# Patient Record
Sex: FEMALE | Race: WHITE | Employment: OTHER | URBAN - METROPOLITAN AREA
[De-identification: names, ages, dates, MRNs, and addresses within clinical notes are randomized per-mention and may not be internally consistent; named-entity substitution may affect disease eponyms.]

---

## 2017-03-31 NOTE — TELEPHONE ENCOUNTER
Patient is requesting refill for Methotrexate 2.5 mg, to take 4 tablets once weekly. LOV 11/14/16. RTC 05/15/17. Last refill on 10/14/16 for 52 tablets / 1 refill. Please advise.

## 2017-04-13 ENCOUNTER — HOSPITAL ENCOUNTER (INPATIENT)
Facility: HOSPITAL | Age: 82
LOS: 7 days | Discharge: HOME HEALTH | DRG: 176 | End: 2017-04-20
Attending: EMERGENCY MEDICINE | Admitting: HOSPITALIST
Payer: MEDICARE

## 2017-04-13 ENCOUNTER — APPOINTMENT (OUTPATIENT)
Dept: ULTRASOUND IMAGING | Facility: HOSPITAL | Age: 82
DRG: 176 | End: 2017-04-13
Attending: EMERGENCY MEDICINE
Payer: MEDICARE

## 2017-04-13 ENCOUNTER — APPOINTMENT (OUTPATIENT)
Dept: GENERAL RADIOLOGY | Facility: HOSPITAL | Age: 82
DRG: 176 | End: 2017-04-13
Attending: EMERGENCY MEDICINE
Payer: MEDICARE

## 2017-04-13 DIAGNOSIS — I82.492 ACUTE DEEP VEIN THROMBOSIS (DVT) OF OTHER SPECIFIED VEIN OF LEFT LOWER EXTREMITY (HCC): ICD-10-CM

## 2017-04-13 DIAGNOSIS — I26.99 OTHER ACUTE PULMONARY EMBOLISM WITHOUT ACUTE COR PULMONALE (HCC): Primary | ICD-10-CM

## 2017-04-13 PROBLEM — R07.9 CHEST PAIN: Status: ACTIVE | Noted: 2017-04-13

## 2017-04-13 PROCEDURE — 99223 1ST HOSP IP/OBS HIGH 75: CPT | Performed by: HOSPITALIST

## 2017-04-13 PROCEDURE — 71010 XR CHEST AP PORTABLE  (CPT=71010): CPT

## 2017-04-13 PROCEDURE — 93970 EXTREMITY STUDY: CPT

## 2017-04-13 RX ORDER — ACETAMINOPHEN 325 MG/1
650 TABLET ORAL EVERY 6 HOURS PRN
Status: DISCONTINUED | OUTPATIENT
Start: 2017-04-13 | End: 2017-04-15

## 2017-04-13 RX ORDER — HEPARIN SODIUM AND DEXTROSE 10000; 5 [USP'U]/100ML; G/100ML
INJECTION INTRAVENOUS CONTINUOUS
Status: DISCONTINUED | OUTPATIENT
Start: 2017-04-14 | End: 2017-04-18

## 2017-04-13 RX ORDER — SODIUM PHOSPHATE, DIBASIC AND SODIUM PHOSPHATE, MONOBASIC 7; 19 G/133ML; G/133ML
1 ENEMA RECTAL ONCE AS NEEDED
Status: ACTIVE | OUTPATIENT
Start: 2017-04-13 | End: 2017-04-13

## 2017-04-13 RX ORDER — MORPHINE SULFATE 2 MG/ML
1 INJECTION, SOLUTION INTRAMUSCULAR; INTRAVENOUS EVERY 2 HOUR PRN
Status: DISCONTINUED | OUTPATIENT
Start: 2017-04-13 | End: 2017-04-20

## 2017-04-13 RX ORDER — POLYETHYLENE GLYCOL 3350 17 G/17G
17 POWDER, FOR SOLUTION ORAL DAILY PRN
Status: DISCONTINUED | OUTPATIENT
Start: 2017-04-13 | End: 2017-04-20

## 2017-04-13 RX ORDER — HEPARIN SODIUM AND DEXTROSE 10000; 5 [USP'U]/100ML; G/100ML
18 INJECTION INTRAVENOUS ONCE
Status: DISCONTINUED | OUTPATIENT
Start: 2017-04-13 | End: 2017-04-18

## 2017-04-13 RX ORDER — HEPARIN SODIUM AND DEXTROSE 10000; 5 [USP'U]/100ML; G/100ML
INJECTION INTRAVENOUS CONTINUOUS
Status: DISCONTINUED | OUTPATIENT
Start: 2017-04-14 | End: 2017-04-13

## 2017-04-13 RX ORDER — MORPHINE SULFATE 4 MG/ML
4 INJECTION, SOLUTION INTRAMUSCULAR; INTRAVENOUS EVERY 2 HOUR PRN
Status: DISCONTINUED | OUTPATIENT
Start: 2017-04-13 | End: 2017-04-20

## 2017-04-13 RX ORDER — MORPHINE SULFATE 2 MG/ML
2 INJECTION, SOLUTION INTRAMUSCULAR; INTRAVENOUS EVERY 2 HOUR PRN
Status: DISCONTINUED | OUTPATIENT
Start: 2017-04-13 | End: 2017-04-20

## 2017-04-13 RX ORDER — HEPARIN SODIUM AND DEXTROSE 10000; 5 [USP'U]/100ML; G/100ML
18 INJECTION INTRAVENOUS ONCE
Status: COMPLETED | OUTPATIENT
Start: 2017-04-13 | End: 2017-04-14

## 2017-04-13 RX ORDER — ONDANSETRON 2 MG/ML
4 INJECTION INTRAMUSCULAR; INTRAVENOUS EVERY 6 HOURS PRN
Status: DISCONTINUED | OUTPATIENT
Start: 2017-04-13 | End: 2017-04-20

## 2017-04-13 RX ORDER — HEPARIN SODIUM 1000 [USP'U]/ML
80 INJECTION, SOLUTION INTRAVENOUS; SUBCUTANEOUS ONCE
Status: COMPLETED | OUTPATIENT
Start: 2017-04-13 | End: 2017-04-13

## 2017-04-13 RX ORDER — DOCUSATE SODIUM 100 MG/1
100 CAPSULE, LIQUID FILLED ORAL 2 TIMES DAILY
Status: DISCONTINUED | OUTPATIENT
Start: 2017-04-13 | End: 2017-04-20

## 2017-04-13 RX ORDER — BISACODYL 10 MG
10 SUPPOSITORY, RECTAL RECTAL
Status: DISCONTINUED | OUTPATIENT
Start: 2017-04-13 | End: 2017-04-20

## 2017-04-13 NOTE — ED INITIAL ASSESSMENT (HPI)
C/O 7/10 RIGHT CHEST PAIN UNDER HER BREAST THAT BEGAN TODAY, + PAIN WITH DEEP INSPIRATION, + LOWER EXTREM SWELLING BILATERALLY

## 2017-04-13 NOTE — ED NOTES
Pt presents to ED with \"under my right rib\" pain which onset this morning. Pt states experiencing exertional dyspnea earlier today, denies dyspnea at rest. Pt reports feeling sharp pain to right rib area with deep inspiration. Denies any other s/s.

## 2017-04-14 ENCOUNTER — APPOINTMENT (OUTPATIENT)
Dept: CT IMAGING | Facility: HOSPITAL | Age: 82
DRG: 176 | End: 2017-04-14
Attending: INTERNAL MEDICINE
Payer: MEDICARE

## 2017-04-14 PROCEDURE — 71260 CT THORAX DX C+: CPT

## 2017-04-14 PROCEDURE — 99222 1ST HOSP IP/OBS MODERATE 55: CPT | Performed by: INTERNAL MEDICINE

## 2017-04-14 PROCEDURE — 99233 SBSQ HOSP IP/OBS HIGH 50: CPT | Performed by: HOSPITALIST

## 2017-04-14 RX ORDER — GABAPENTIN 300 MG/1
600 CAPSULE ORAL NIGHTLY
Status: DISCONTINUED | OUTPATIENT
Start: 2017-04-14 | End: 2017-04-20

## 2017-04-14 RX ORDER — ACETAMINOPHEN 500 MG
1000 TABLET ORAL EVERY 8 HOURS PRN
Status: DISCONTINUED | OUTPATIENT
Start: 2017-04-14 | End: 2017-04-20

## 2017-04-14 RX ORDER — WARFARIN SODIUM 5 MG/1
5 TABLET ORAL NIGHTLY
Status: DISCONTINUED | OUTPATIENT
Start: 2017-04-14 | End: 2017-04-17

## 2017-04-14 RX ORDER — LEVOTHYROXINE SODIUM 0.05 MG/1
50 TABLET ORAL
Status: DISCONTINUED | OUTPATIENT
Start: 2017-04-14 | End: 2017-04-20

## 2017-04-14 RX ORDER — FOLIC ACID 1 MG/1
1 TABLET ORAL
Status: DISCONTINUED | OUTPATIENT
Start: 2017-04-14 | End: 2017-04-20

## 2017-04-14 RX ORDER — IPRATROPIUM BROMIDE AND ALBUTEROL SULFATE 2.5; .5 MG/3ML; MG/3ML
3 SOLUTION RESPIRATORY (INHALATION) EVERY 6 HOURS PRN
Status: DISCONTINUED | OUTPATIENT
Start: 2017-04-14 | End: 2017-04-20

## 2017-04-14 RX ORDER — IPRATROPIUM BROMIDE AND ALBUTEROL SULFATE 2.5; .5 MG/3ML; MG/3ML
3 SOLUTION RESPIRATORY (INHALATION) EVERY 6 HOURS PRN
Status: DISCONTINUED | OUTPATIENT
Start: 2017-04-14 | End: 2017-04-14

## 2017-04-14 NOTE — DISCHARGE PLANNING
DANETTE following up on d/c planning for the patient. She was admitted from [facility] and plans to return on discharge. DANETTE met with her and her daughter re advance directives.   The daughter believes that they have a [facility] and she will look for this at

## 2017-04-14 NOTE — CONSULTS
[facility] HOSP - [facility]    Report of Consultation    Wendie Teague Patient Status:  Inpatient    1925 MRN [id number]   Location [facility] 2W/SW Attending Earline Brooks MD   Hosp Day # 1 PCP Dm Carney MD     Date of Admission:   stairs.     The patient resides in [facility] in [place]            Current Medications:    Current Facility-Administered Medications:  ipratropium-albuterol (DUONEB) nebulizer solution 3 mL 3 mL Nebulization Q6H PRN   acetaminophen ([address] Oral Tab Take  by mouth. Take 1 tab. Every day   lisinopril (PRINIVIL,ZESTRIL) 20 MG Oral Tab Take 10 mg by mouth. Take 1 tab.  Every day        Allergies  No Known Allergies    Review of Systems:   Constitutional: denies fevers, chills, weakness, fatigue, COMPARISON: Chuy NADIR Mcgraw RT, 8/14/2006, 17:35. INDICATIONS: Lower extremity swelling bilaterally and concern for DVT.   TECHNIQUE: Color duplex Doppler venous ultrasound of both lower extremities was performed in the  usual (CST): Shaheen Estrada MD on 4/13/2017 at 19:14          4/13/2017  CONCLUSION:  1. Limited inspiration. 2. Cardiomegaly with mild CHF with basilar consolidation/atelectasis. 3. Atherosclerosis. 4. Demineralization. 5. Scoliosis. 6. Osteoarthritis.

## 2017-04-14 NOTE — H&P
[facility]    PATIENT'S NAME: Malissa Alice   ATTENDING PHYSICIAN: Nandini Blank MD   PATIENT ACCOUNT#:   [id number]    LOCATION:  [place] [address]  MEDICAL RECORD #:   [id number]       YOB: 1925  ADMISSION DATE:       04 details. SOCIAL HISTORY:  No tobacco, alcohol, or drug use. Sedentary lifestyle. REVIEW OF SYSTEMS:  Left leg tightness and discomfort for the last 2 days. This morning woke up with right-sided pleuritic chest pain, increased with deep breathing. 20:03:10  Job [id number]  FB/

## 2017-04-14 NOTE — PROGRESS NOTES
[facility]    Progress Note    Rayna Shetty Patient Status:  Inpatient    1925 MRN [id number]   Location [facility] 2W/SW Attending Dick Mosqueda MD   Hosp Day # 1 PCP Julia Peralta MD     Subjective:  R chest pain impro There is a right Baker's cyst.       Xr Chest Ap Portable  (cpt=71010)    4/13/2017  CONCLUSION:  1. Limited inspiration. 2. Cardiomegaly with mild CHF with basilar consolidation/atelectasis. 3. Atherosclerosis. 4. Demineralization. 5. Scoliosis.  6. Scar Pares

## 2017-04-14 NOTE — PLAN OF CARE
Problem: Patient Centered Care  Goal: Patient preferences are identified and integrated in the patient’s plan of care  Interventions:  - What would you like us to know as we care for you? I would like my family updated to my care.    - Provide timely, compl indicated  - Manage/alleviate anxiety  - Monitor for signs/symptoms of CO2 retention   Outcome: Progressing  Sats >94% on 3LNC. No SOB reported.     Problem: SKIN/TISSUE INTEGRITY - ADULT  Goal: Skin integrity remains intact  INTERVENTIONS  - Assess and doc assistance. Call light within reach.

## 2017-04-14 NOTE — PLAN OF CARE
Problem: Patient Centered Care  Goal: Patient preferences are identified and integrated in the patient’s plan of care  Interventions:  - What would you like us to know as we care for you? I would like my family updated to my care.    - Provide timely, compl ADULT  Goal: Skin integrity remains intact  INTERVENTIONS  - Assess and document risk factors for pressure ulcer development  - Assess and document skin integrity  - Monitor for areas of redness and/or skin breakdown  - Initiate interventions, skin care al

## 2017-04-14 NOTE — ED PROVIDER NOTES
Patient Seen in: [facility] 2w/sw    History   Patient presents with:  Chest Pain Angina (cardiovascular)    Stated Complaint: CHEST PAIN    HPI    Patient is a 55-year-old female who presents to the emergency department with chief complaint of ri rheumatoid Arthritis         Smoking Status: Never Smoker                      Smokeless Status: Never Used                        Alcohol Use: No                Review of Systems    Positive for stated complaint: CHEST PAIN  Other systems are as noted in following:     D-Dimer >4.00 (*)     All other components within normal limits   CBC W/ DIFFERENTIAL - Abnormal; Notable for the following:     WBC 14.0 (*)     RBC 3.60 (*)     HGB 9.6 (*)     HCT 30.8 (*)     MCH 26.7 (*)     MCHC 31.3 (*)     RDW 23.1 ( DVT/PE.   Will admit        Disposition and Plan     Clinical Impression:  Other acute pulmonary embolism without acute cor pulmonale (HCC)  (primary encounter diagnosis)  Acute deep vein thrombosis (DVT) of other specified vein of left lower extremity ([address].

## 2017-04-15 PROCEDURE — 99233 SBSQ HOSP IP/OBS HIGH 50: CPT | Performed by: INTERNAL MEDICINE

## 2017-04-15 PROCEDURE — 99233 SBSQ HOSP IP/OBS HIGH 50: CPT | Performed by: HOSPITALIST

## 2017-04-15 NOTE — PROGRESS NOTES
[facility]    Progress Note    Patrice Mcmahan Patient Status:  Inpatient    1925 MRN [id number]   Location [facility] 2W/SW Attending Roxanne Schwab MD   Hosp Day # 2 PCP Juan Muhammad MD     Subjective:  R chest pain impro Bilat - Diag [facility] (cpt=93970)    4/13/2017  CONCLUSION:   Left lower extremity positive for deep venous thrombosis. There is extensive DVT involving the femoral veins (paired), popliteal vein, and posterior tibial veins.   Right lower extremity negative for Interpretation  -------------------------- Sinus Tachycardia Low voltage in limb leads.  -Poor R-wave progression -may be secondary to pulmonary disease consider old anterior infarct.  ABNORMAL When compared with ECG of 04/19/2016 15:36:59 HR has increased

## 2017-04-15 NOTE — PROGRESS NOTES
[facility] HOSP - [facility]    Progress Note    Diedre Holstein Patient Status:  Inpatient    1925 MRN [id number]   Location [facility] 2W/SW Attending Aly Altamirano MD   Hosp Day # 2 PCP Je Lund MD     Subjective:     Jenifer Almeida 0.06* 04/13/2017       Us Venous Doppler Leg Bilat - Diag Img (blg=[id number])    4/13/2017  CONCLUSION:   Left lower extremity positive for deep venous thrombosis.  There is extensive DVT involving the femoral veins (paired), popliteal vein, and posterior tibia PM.       Ekg 12-lead    4/13/2017  ECG Report  Interpretation  -------------------------- Sinus Tachycardia Low voltage in limb leads.  -Poor R-wave progression -may be secondary to pulmonary disease consider old anterior infarct.  ABNORMAL When compared w

## 2017-04-16 PROCEDURE — 99232 SBSQ HOSP IP/OBS MODERATE 35: CPT | Performed by: INTERNAL MEDICINE

## 2017-04-16 PROCEDURE — 99233 SBSQ HOSP IP/OBS HIGH 50: CPT | Performed by: HOSPITALIST

## 2017-04-16 NOTE — PLAN OF CARE
PAIN - ADULT    • Verbalizes/displays adequate comfort level or patient's stated pain goal    DENIES PAIN AT [address] [facility] Progressing        Patient Centered Care    • Patient preferences are identified and integrated in the patient's plan of care Progressing

## 2017-04-16 NOTE — PROGRESS NOTES
[facility]    Progress Note    Laverne Jacob Patient Status:  Inpatient    1925 MRN [id number]   Location [facility] 2W/SW Attending Flip Marquez MD   Hosp Day # 3 PCP Vin Khanna MD     Subjective:  R chest pain impro DDIMER  >4.00*   --    --    --    --    TROP  0.06*   --    --    --    --        Ct Chest Pain/pe (iv Only) Em    4/14/2017  CONCLUSION:  1.    Bilateral occlusive pulmonary embolus within the proximal lobar artery branches of the right lower lobe and l and Plan:     Acute bilateral PE  Acute DVT L femoral veins, popliteal vein, posterior tibial vein  Factor V leiden  - was on coumadin for 10 years, stopped 1 year ago and switched to asa due to concern for falls.   - restarted coumadin and stopped asa  - c

## 2017-04-16 NOTE — PROGRESS NOTES
[facility]    Progress Note    Syeda Bennett Patient Status:  Inpatient    1925 MRN [id number]   Location [facility] 3W/SW Attending Bruno Quan MD   Hosp Day # 3 PCP Elsy Dick MD     Subjective:   Subjective: on the right. 4.  Cardiomegaly. Pulmonary artery enlargement with pulmonary emboli. Findings suggest right heart strain and/or pulmonary artery hypertension. 5.  Ascending thoracic aortic aneurysm measuring up to 45 mm in diameter.   There are aortic valv

## 2017-04-17 ENCOUNTER — APPOINTMENT (OUTPATIENT)
Dept: CV DIAGNOSTICS | Facility: HOSPITAL | Age: 82
DRG: 176 | End: 2017-04-17
Attending: HOSPITALIST
Payer: MEDICARE

## 2017-04-17 PROCEDURE — 93306 TTE W/DOPPLER COMPLETE: CPT | Performed by: INTERNAL MEDICINE

## 2017-04-17 PROCEDURE — 93306 TTE W/DOPPLER COMPLETE: CPT

## 2017-04-17 PROCEDURE — 99232 SBSQ HOSP IP/OBS MODERATE 35: CPT | Performed by: INTERNAL MEDICINE

## 2017-04-17 PROCEDURE — 99232 SBSQ HOSP IP/OBS MODERATE 35: CPT | Performed by: HOSPITALIST

## 2017-04-17 RX ORDER — 0.9 % SODIUM CHLORIDE 0.9 %
VIAL (ML) INJECTION
Status: COMPLETED
Start: 2017-04-17 | End: 2017-04-17

## 2017-04-17 RX ORDER — WARFARIN SODIUM 4 MG/1
4 TABLET ORAL NIGHTLY
Status: DISCONTINUED | OUTPATIENT
Start: 2017-04-17 | End: 2017-04-18

## 2017-04-17 NOTE — PROGRESS NOTES
Pulmonary/Critical Care Follow Up Note    HPI:   Anh Ac is a 80year old female with Patient presents with:  Chest Pain Angina (cardiovascular)      PCP Sujatha Thompson MD  Admission Attending Sade Green MD    Hospital Day #4    No CP  No Allergies        PHYSICAL EXAM:   Blood pressure 120/73, pulse 77, temperature 97.9 °F (36.6 °C), temperature source Oral, resp. rate 18, height 5' 1\" (1.549 m), weight 170 lb 14.4 oz (77.52 kg), SpO2 94 %.     Intake/Output Summary (Last 24 hours) at 04/1

## 2017-04-17 NOTE — PLAN OF CARE
Problem: Patient/Family Goals  Goal: Patient/Family Long Term Goal  Patient’s Long Term Goal: Return to independent living    Interventions:  - Bridging to coumadin from heparin gtt  - patient education regarding medication/warfarin compliance  - follow up

## 2017-04-17 NOTE — PROGRESS NOTES
[facility]    Progress Note    Jennifer Laboy Patient Status:  Inpatient    1925 MRN [id number]   Location [facility] 3W/SW Attending Sebastien Lawson MD   Hosp Day # 4 PCP Dianelys Taylor MD       Subjective:   Betty Hauser artery enlargement with pulmonary emboli. Findings suggest right heart strain and/or pulmonary artery hypertension. 5.  Ascending thoracic aortic aneurysm measuring up to 45 mm in diameter. There are aortic valvular calcifications.   Correlate for aortic

## 2017-04-18 PROCEDURE — 99233 SBSQ HOSP IP/OBS HIGH 50: CPT | Performed by: HOSPITALIST

## 2017-04-18 PROCEDURE — 99232 SBSQ HOSP IP/OBS MODERATE 35: CPT | Performed by: INTERNAL MEDICINE

## 2017-04-18 NOTE — PROGRESS NOTES
[facility]    Progress Note    Levy Koyanagi Patient Status:  Inpatient    1925 MRN [id number]   Location [facility] 3W/SW Attending Barby Noel MD   Hosp Day # 5 PCP Airam Glover MD     Subjective:   Aftab Cook

## 2017-04-18 NOTE — OCCUPATIONAL THERAPY NOTE
OCCUPATIONAL THERAPY EVALUATION - INPATIENT     Room Number: 336/336-A  Evaluation Date: 4/18/2017  Type of Evaluation: Initial  Presenting Problem:  (acute PE, LT LE DVT)    Physician Order: IP Consult to Occupational Therapy  Reason for Therapy: ADL/IADL toilet  Shower/Tub and Equipment: Grab bar;Tub seat;Tub only  Other Equipment:  (tri pod walker)    Occupation/Status:  (does not work)  Hand Dominance: Right  Drives: No  Patient Regularly Uses: Glasses    Stairs in Home: 0  Use of Assistive Device(s): tr Extremity Dressing:  I  Lower Extremity Dressing: I able to demo JUNIOR cross leg technique to reach JUNIOR LEs      Education Provide: Discussed role of OT, pacing, posture with pt verbalizing good understanding with no further questions  Patient End of Session:

## 2017-04-18 NOTE — PHYSICAL THERAPY NOTE
PHYSICAL THERAPY QUICK EVALUATION - INPATIENT    Room Number: 336/336-A  Evaluation Date: 4/18/2017          Problem List  Principal Problem:    Other acute pulmonary embolism without acute cor pulmonale (HCC)  Active Problems:    Pulmonary embolism withou Score: 11.2%   Standardized Score (AM-PAC Scale): 56.93   CMS Modifier (G-Code): CI      FUNCTIONAL ABILITY STATUS  Gait Assessment  Gait Assistance: Modified independent  Distance (ft):  (100)  Assistive Device: Rolling walker             Skilled Therapy

## 2017-04-18 NOTE — PROGRESS NOTES
[facility]    Progress Note    Dyllan Aguiar Patient Status:  Inpatient    1925 MRN [id number]   Location [facility] 3W/SW Attending Quinton Kaur MD   Hosp Day # 5 PCP Darrell Alberts MD       Subjective:   Gaston Byrnes Diastolic Dysfunction    HTN   - hold home med as BP on low side  - monitor daily     Anemia of chronic disease  -stable     Elevated troponin - 2/2 PE  - no further cardiac workup needed    Leukocytosis  - Resolved    - monitor      Quality:  · DVT Prophy

## 2017-04-19 PROCEDURE — 99233 SBSQ HOSP IP/OBS HIGH 50: CPT | Performed by: HOSPITALIST

## 2017-04-19 PROCEDURE — 99231 SBSQ HOSP IP/OBS SF/LOW 25: CPT | Performed by: INTERNAL MEDICINE

## 2017-04-19 RX ORDER — 0.9 % SODIUM CHLORIDE 0.9 %
VIAL (ML) INJECTION
Status: COMPLETED
Start: 2017-04-19 | End: 2017-04-19

## 2017-04-19 NOTE — PLAN OF CARE
States rt. Rib area pain is int. & that prn tyl. Helps, dr. [place] updated on pt. & labs-cont. To hold coumadin-reevaluate labs tomorrow. Cont. To monitor.      PAIN - ADULT    • Verbalizes/displays adequate comfort level or patient's stated pain goal Progr

## 2017-04-19 NOTE — PROGRESS NOTES
[facility] HOSP - [facility]    Progress Note    Sheila Scruggs Patient Status:  Inpatient    1925 MRN [id number]   Location [facility] 3W/SW Attending Ania Shi MD   Hosp Day # 6 PCP Olga Mederos MD       Subjective:   Vinod Salas 17% Grade 1 Diastolic Dysfunction  - given that she is a fall risk and she lives alone and that her INR trended up today will cont to watch here another day until INR drifts down, if elevated again tomorrow can give a very low dose of vit k - d/w Dr Khloe Logan

## 2017-04-19 NOTE — PROGRESS NOTES
Pulmonary/Critical Care Follow Up Note    HPI:   Alcira Room is a 80year old female with Patient presents with:  Chest Pain Angina (cardiovascular)      PCP Jose Kunz MD  Admission Attending Hemanth Lilly MD    Hospital Day #6    No CP  No (Last 24 hours) at 04/19/17 1700  Last data filed at 04/19/17 0800   Gross per 24 hour   Intake    362 ml   Output    500 ml   Net   -138 ml     NAD  A/OX 3        LABS:      Lab Results  Component Value Date   WBC 8.5 04/19/2017   HGB 9.7 04/19/2017   HCT

## 2017-04-20 VITALS
OXYGEN SATURATION: 95 % | DIASTOLIC BLOOD PRESSURE: 66 MMHG | HEART RATE: 92 BPM | HEIGHT: 61 IN | BODY MASS INDEX: 31.3 KG/M2 | TEMPERATURE: 98 F | WEIGHT: 165.81 LBS | SYSTOLIC BLOOD PRESSURE: 133 MMHG | RESPIRATION RATE: 18 BRPM

## 2017-04-20 PROCEDURE — 99232 SBSQ HOSP IP/OBS MODERATE 35: CPT | Performed by: INTERNAL MEDICINE

## 2017-04-20 PROCEDURE — 99239 HOSP IP/OBS DSCHRG MGMT >30: CPT | Performed by: HOSPITALIST

## 2017-04-20 RX ORDER — WARFARIN SODIUM 3 MG/1
3 TABLET ORAL DAILY
Qty: 30 TABLET | Refills: 0 | Status: SHIPPED | OUTPATIENT
Start: 2017-04-20 | End: 2017-05-20

## 2017-04-20 NOTE — DISCHARGE PLANNING
4/20/17 CM Discharge planning / MDO for home health   Poke with dtr Lani Guzman via phone [phone], discussed home health options. [facility]. Referral and orders faxed to [facility] for home RN and PT. Dtr will transport home.   Jorge Wilson

## 2017-04-20 NOTE — PROGRESS NOTES
[facility]     Progress Note        Chandana Perez Patient Status:  Inpatient    1925 MRN [id number]   Location [facility] 3W/SW Attending Donnell Shah MD   Hosp Day # 7 PCP Clifford Zuniga MD       Subjective:   [facility] rhonchi, crackles  GI: abdomen soft, non tender  Extremities: no clubbing, cyanosis  Neurologic: no gross motor deficits  Skin: warm, dry      Results:     Lab Results  Component Value Date   WBC 9.5 04/20/2017   HGB 9.0 04/20/2017   HCT 27.9 04/20/2017 with lower extremity swelling bilaterally today. TECHNIQUE:   Single view. FINDINGS: Limited inspiration. CARDIAC/VASC: The heart is mildly enlarged. MEDIAST/JENI:   The aorta is elongated and tortuous with atherosclerotic plaques.  No visible mass or a patchy and linear pulmonary opacity is in both lower lobes which may represent areas of atelectasis or infarcts as there are streak following emboli. There are small bilateral pleural effusions, larger on  the right.  VASCULATURE: The main pulmonary artery Nonspecific pancreatic ductal dilatation. The pancreas is not completely included in the field of imaging of this exam however the body of the pancreas shows ductal dilatation up to 7-mm.   Etiology is unclear as the central aspect of pancreas/pancreatic

## 2017-04-20 NOTE — DISCHARGE SUMMARY
[facility]    Discharge Summary    Diedre Holstein Patient Status:  Inpatient    1925 MRN [id number]   Location [facility] 3W/SW Attending Joselito Wallace MD   Hosp Day # 7 PCP Je Lund MD     Date of Admission: 59-year-old  female with history of factor V Leiden and DVT who was taken off Coumadin last year secondary to possible gastrointestinal bleed and placed on low-dose aspirin instead.  She noticed that her left leg is more swollen and tight for the 4 mg on 4/17/2017  8:17 PM   Commonly known as:  COUMADIN        Take 1 tablet (3 mg total) by mouth daily.     Stop taking on:  5/20/2017   Quantity:  30 tablet   Refills:  0         CHANGE how you take these medications       Instructions Prescription det

## 2017-05-15 ENCOUNTER — OFFICE VISIT (OUTPATIENT)
Dept: RHEUMATOLOGY | Facility: CLINIC | Age: 82
End: 2017-05-15

## 2017-05-15 VITALS
DIASTOLIC BLOOD PRESSURE: 84 MMHG | WEIGHT: 170.69 LBS | SYSTOLIC BLOOD PRESSURE: 131 MMHG | HEART RATE: 94 BPM | BODY MASS INDEX: 30.24 KG/M2 | HEIGHT: 63 IN

## 2017-05-15 DIAGNOSIS — M06.09 SERONEGATIVE RHEUMATOID ARTHRITIS OF MULTIPLE SITES (HCC): ICD-10-CM

## 2017-05-15 DIAGNOSIS — M48.061 LUMBAR STENOSIS: Primary | ICD-10-CM

## 2017-05-15 PROCEDURE — 99213 OFFICE O/P EST LOW 20 MIN: CPT | Performed by: INTERNAL MEDICINE

## 2017-05-15 PROCEDURE — G0463 HOSPITAL OUTPT CLINIC VISIT: HCPCS | Performed by: INTERNAL MEDICINE

## 2017-05-15 RX ORDER — LISINOPRIL 10 MG/1
10 TABLET ORAL DAILY
COMMUNITY
Start: 2017-04-12

## 2017-05-15 NOTE — PROGRESS NOTES
HPI:    Patient ID: Bereket Diego is a 80year old female. HPI Comments: Abdoul Jensen is a pleasant 80-year-old patient of Dr. 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  Since I saw her 6 months ago she had continued to take 10 breath sounds normal.   Abdominal: Soft. Bowel sounds are normal. She exhibits no mass. There is no tenderness. There is no rebound and no guarding. Musculoskeletal: She exhibits no edema. There is not synovitis in her wrists or hands.    Lymphadenopath

## 2017-07-17 ENCOUNTER — LAB ENCOUNTER (OUTPATIENT)
Dept: LAB | Facility: HOSPITAL | Age: 82
End: 2017-07-17
Attending: INTERNAL MEDICINE
Payer: MEDICARE

## 2017-07-17 ENCOUNTER — OFFICE VISIT (OUTPATIENT)
Dept: RHEUMATOLOGY | Facility: CLINIC | Age: 82
End: 2017-07-17

## 2017-07-17 VITALS
WEIGHT: 171.88 LBS | HEART RATE: 97 BPM | BODY MASS INDEX: 30.45 KG/M2 | HEIGHT: 63 IN | DIASTOLIC BLOOD PRESSURE: 81 MMHG | SYSTOLIC BLOOD PRESSURE: 133 MMHG

## 2017-07-17 DIAGNOSIS — M35.3 POLYMYALGIA RHEUMATICA (HCC): ICD-10-CM

## 2017-07-17 DIAGNOSIS — M48.061 LUMBAR STENOSIS: ICD-10-CM

## 2017-07-17 DIAGNOSIS — M06.09 SERONEGATIVE RHEUMATOID ARTHRITIS OF MULTIPLE SITES (HCC): ICD-10-CM

## 2017-07-17 DIAGNOSIS — Z51.81 THERAPEUTIC DRUG MONITORING: ICD-10-CM

## 2017-07-17 DIAGNOSIS — M06.09 SERONEGATIVE RHEUMATOID ARTHRITIS OF MULTIPLE SITES (HCC): Primary | ICD-10-CM

## 2017-07-17 DIAGNOSIS — M54.16 LUMBAR RADICULOPATHY: ICD-10-CM

## 2017-07-17 LAB
ALBUMIN SERPL BCP-MCNC: 4 G/DL (ref 3.5–4.8)
AST SERPL-CCNC: 24 U/L (ref 15–41)
BASOPHILS # BLD: 0 K/UL (ref 0–0.2)
BASOPHILS NFR BLD: 0 %
CREAT SERPL-MCNC: 0.94 MG/DL (ref 0.5–1.5)
CRP SERPL-MCNC: 5.9 MG/DL (ref 0–0.9)
EOSINOPHIL # BLD: 0.2 K/UL (ref 0–0.7)
EOSINOPHIL NFR BLD: 2 %
ERYTHROCYTE [DISTWIDTH] IN BLOOD BY AUTOMATED COUNT: 18.9 % (ref 11–15)
ERYTHROCYTE [SEDIMENTATION RATE] IN BLOOD: 43 MM/HR (ref 0–42)
HCT VFR BLD AUTO: 36.3 % (ref 35–48)
HGB BLD-MCNC: 12 G/DL (ref 12–16)
LYMPHOCYTES # BLD: 1.4 K/UL (ref 1–4)
LYMPHOCYTES NFR BLD: 14 %
MCH RBC QN AUTO: 27.4 PG (ref 27–32)
MCHC RBC AUTO-ENTMCNC: 33.1 G/DL (ref 32–37)
MCV RBC AUTO: 83 FL (ref 80–100)
MONOCYTES # BLD: 0.9 K/UL (ref 0–1)
MONOCYTES NFR BLD: 9 %
NEUTROPHILS # BLD AUTO: 7.2 K/UL (ref 1.8–7.7)
NEUTROPHILS NFR BLD: 75 %
PLATELET # BLD AUTO: 401 K/UL (ref 140–400)
PMV BLD AUTO: 8.7 FL (ref 7.4–10.3)
RBC # BLD AUTO: 4.37 M/UL (ref 3.7–5.4)
WBC # BLD AUTO: 9.7 K/UL (ref 4–11)

## 2017-07-17 PROCEDURE — 82565 ASSAY OF CREATININE: CPT

## 2017-07-17 PROCEDURE — 86140 C-REACTIVE PROTEIN: CPT

## 2017-07-17 PROCEDURE — 85025 COMPLETE CBC W/AUTO DIFF WBC: CPT

## 2017-07-17 PROCEDURE — 82040 ASSAY OF SERUM ALBUMIN: CPT

## 2017-07-17 PROCEDURE — 84450 TRANSFERASE (AST) (SGOT): CPT

## 2017-07-17 PROCEDURE — 99214 OFFICE O/P EST MOD 30 MIN: CPT | Performed by: INTERNAL MEDICINE

## 2017-07-17 PROCEDURE — 85652 RBC SED RATE AUTOMATED: CPT

## 2017-07-17 PROCEDURE — 36415 COLL VENOUS BLD VENIPUNCTURE: CPT

## 2017-07-17 PROCEDURE — G0463 HOSPITAL OUTPT CLINIC VISIT: HCPCS | Performed by: INTERNAL MEDICINE

## 2017-07-17 RX ORDER — PREDNISONE 1 MG/1
TABLET ORAL
Qty: 90 TABLET | Refills: 3 | Status: SHIPPED | OUTPATIENT
Start: 2017-07-17 | End: 2017-08-21

## 2017-07-17 RX ORDER — FOLIC ACID 1 MG/1
TABLET ORAL
Qty: 90 TABLET | Refills: 3 | COMMUNITY
Start: 2017-07-17 | End: 2017-10-02

## 2017-07-17 RX ORDER — WARFARIN SODIUM 3 MG/1
3 TABLET ORAL DAILY
COMMUNITY
Start: 2017-07-11

## 2017-07-17 RX ORDER — FERROUS SULFATE 325(65) MG
325 TABLET ORAL DAILY
COMMUNITY
Start: 2017-02-01 | End: 2020-02-21 | Stop reason: ALTCHOICE

## 2017-07-17 RX ORDER — METHOTREXATE 2.5 MG/1
TABLET ORAL
Qty: 52 TABLET | Refills: 1 | Status: SHIPPED | OUTPATIENT
Start: 2017-07-17 | End: 2017-10-02

## 2017-07-17 NOTE — PROGRESS NOTES
HPI:    Patient ID: Diedre Holstein is a 80year old female. Kathleen Jackson is a pleasant 70-year-old patient of Dr. 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  Since I saw her 2 months ago, she has remained off weekly methotrexate breakfast.   Disp:  Rfl:      Allergies:No Known Allergies   PHYSICAL EXAM:   Physical Exam   Constitutional: She is oriented to person, place, and time. She appears well-developed. HENT:   Head: Normocephalic.    Eyes: Conjunctivae are normal.   Cardiova

## 2017-08-10 ENCOUNTER — TELEPHONE (OUTPATIENT)
Dept: RHEUMATOLOGY | Facility: CLINIC | Age: 82
End: 2017-08-10

## 2017-08-10 NOTE — TELEPHONE ENCOUNTER
Dr. Simin Colmenares, please see message below. Thank you. pt requesting to speak directly to you regarding s/sx and advise. Good call back number is [phone].     Spoke to pt, states was restarted on Prednisone 5 mg daily and Methotrexate 25mg once a week dur

## 2017-08-10 NOTE — TELEPHONE ENCOUNTER
Pt states having a lot of stiffness  Requesting appt with Dr Gutierrez/[facility]- no slots

## 2017-08-11 NOTE — TELEPHONE ENCOUNTER
I spoke to Kirk [place]. Methotrexate 10 mg weekly and prednisone 5 mg a day was started on July 17th of 2017. So far, she has not had a response. She continues to be very stiff, especially in the morning, in her shoulders, hips, hands, legs.   She is takin

## 2017-08-17 ENCOUNTER — TELEPHONE (OUTPATIENT)
Dept: RHEUMATOLOGY | Facility: CLINIC | Age: 82
End: 2017-08-17

## 2017-08-17 NOTE — TELEPHONE ENCOUNTER
Please see below. Pt stated she was in a lot of pain with prednisone 5 mg daily. Burst did help. Reports she took prednisone 10 mg this morning and her pain is more tolerable. Please advise. Note 8/11/17     I spoke to Miguel bear.   Methotrexate 10 mg we

## 2017-08-17 NOTE — TELEPHONE ENCOUNTER
Talked to pt. - d/w her to take prednisone 10mg x 1 week, then try to goto 5mg a day -   She could hardly walk this morning at pred 5mg a day -   She feels better today after taking 10mg -  So she will stya on this a little longer.    Will send Dr. Santa Holm

## 2017-08-21 RX ORDER — PREDNISONE 5 MG/1
TABLET ORAL
Qty: 180 TABLET | Refills: 3 | Status: SHIPPED | OUTPATIENT
Start: 2017-08-21 | End: 2018-04-16

## 2017-08-21 NOTE — TELEPHONE ENCOUNTER
LOV: 7-17-17  Last Refilled:#90, 3rfs 7/17/17    Future Appointments  Date Time Provider Jason Gaspar   10/2/2017 10:20 AM Gerson Lees MD [facility]       Please advise.

## 2017-08-25 ENCOUNTER — TELEPHONE (OUTPATIENT)
Dept: RHEUMATOLOGY | Facility: CLINIC | Age: 82
End: 2017-08-25

## 2017-08-25 NOTE — TELEPHONE ENCOUNTER
Pt calling and would like to know if she should be still taking the med below twice a day? Pt stts she is feeling much better then she was when she last called in. Please advise.       Medication Quantity Refills Start End   predniSONE 5 MG Oral Tab 180 t

## 2017-08-25 NOTE — TELEPHONE ENCOUNTER
Please see below and advise. Per 8/17/17 message given to pt per Dr. Murphy Babrer. Talked to pt.  - d/w her to take prednisone 10mg x 1 week, then try to goto 5mg a day -   She could hardly walk this morning at pred 5mg a day -   She feels better toda

## 2017-08-29 NOTE — TELEPHONE ENCOUNTER
Clarified prednisone with pt. She will continue at 10 mg until Thursday then she will go down to 5 mg daily. Call office back with concerns.

## 2017-08-29 NOTE — TELEPHONE ENCOUNTER
[phone] states she is still waiting for a nurse to call back to see what else the  recommends.

## 2017-10-02 ENCOUNTER — TELEPHONE (OUTPATIENT)
Dept: RHEUMATOLOGY | Facility: CLINIC | Age: 82
End: 2017-10-02

## 2017-10-02 ENCOUNTER — OFFICE VISIT (OUTPATIENT)
Dept: RHEUMATOLOGY | Facility: CLINIC | Age: 82
End: 2017-10-02

## 2017-10-02 ENCOUNTER — LAB ENCOUNTER (OUTPATIENT)
Dept: LAB | Facility: HOSPITAL | Age: 82
End: 2017-10-02
Attending: INTERNAL MEDICINE
Payer: MEDICARE

## 2017-10-02 VITALS
SYSTOLIC BLOOD PRESSURE: 115 MMHG | BODY MASS INDEX: 30.23 KG/M2 | HEART RATE: 116 BPM | DIASTOLIC BLOOD PRESSURE: 80 MMHG | HEIGHT: 63 IN | WEIGHT: 170.63 LBS

## 2017-10-02 DIAGNOSIS — M35.3 POLYMYALGIA RHEUMATICA (HCC): ICD-10-CM

## 2017-10-02 DIAGNOSIS — M48.062 SPINAL STENOSIS OF LUMBAR REGION WITH NEUROGENIC CLAUDICATION: ICD-10-CM

## 2017-10-02 DIAGNOSIS — Z51.81 ENCOUNTER FOR THERAPEUTIC DRUG MONITORING: ICD-10-CM

## 2017-10-02 DIAGNOSIS — M06.09 SERONEGATIVE RHEUMATOID ARTHRITIS OF MULTIPLE SITES (HCC): ICD-10-CM

## 2017-10-02 DIAGNOSIS — M06.09 SERONEGATIVE RHEUMATOID ARTHRITIS OF MULTIPLE SITES (HCC): Primary | ICD-10-CM

## 2017-10-02 DIAGNOSIS — Z51.81 THERAPEUTIC DRUG MONITORING: ICD-10-CM

## 2017-10-02 PROCEDURE — G0463 HOSPITAL OUTPT CLINIC VISIT: HCPCS | Performed by: INTERNAL MEDICINE

## 2017-10-02 PROCEDURE — 85025 COMPLETE CBC W/AUTO DIFF WBC: CPT

## 2017-10-02 PROCEDURE — 82565 ASSAY OF CREATININE: CPT

## 2017-10-02 PROCEDURE — 82040 ASSAY OF SERUM ALBUMIN: CPT

## 2017-10-02 PROCEDURE — 99214 OFFICE O/P EST MOD 30 MIN: CPT | Performed by: INTERNAL MEDICINE

## 2017-10-02 PROCEDURE — 36415 COLL VENOUS BLD VENIPUNCTURE: CPT

## 2017-10-02 PROCEDURE — 86140 C-REACTIVE PROTEIN: CPT

## 2017-10-02 PROCEDURE — 85652 RBC SED RATE AUTOMATED: CPT

## 2017-10-02 PROCEDURE — 84450 TRANSFERASE (AST) (SGOT): CPT

## 2017-10-02 RX ORDER — METHOTREXATE 2.5 MG/1
TABLET ORAL
Qty: 52 TABLET | Refills: 1 | Status: SHIPPED | OUTPATIENT
Start: 2017-10-02 | End: 2018-04-16

## 2017-10-02 RX ORDER — PREDNISONE 1 MG/1
TABLET ORAL
Qty: 70 TABLET | Refills: 0 | Status: SHIPPED | OUTPATIENT
Start: 2017-10-02 | End: 2018-01-08 | Stop reason: ALTCHOICE

## 2017-10-02 RX ORDER — FOLIC ACID 1 MG/1
TABLET ORAL
Qty: 90 TABLET | Refills: 3 | Status: SHIPPED | OUTPATIENT
Start: 2017-10-02 | End: 2018-10-18

## 2017-10-02 RX ORDER — BUPRENORPHINE HCL 8 MG/1
1 TABLET SUBLINGUAL DAILY
COMMUNITY

## 2017-10-02 NOTE — TELEPHONE ENCOUNTER
Pt contacted and aware of provider's directions below. Pt verbalized understanding. She will call office back with any questions or concerns.

## 2017-10-02 NOTE — TELEPHONE ENCOUNTER
Please let her know that her inflammation markers are back to normal.    I sent in a prescription for 1 mg prednisone tablets, to be delivered by [place].     She will take 4 mg in the morning for 1 week, 3 mg for 1 week, 2 mg for 1 week, 1 mg for 1 week, an

## 2017-10-02 NOTE — PROGRESS NOTES
Jonh Sams is a pleasant 80-year-old patient of Dr. 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 Since I saw her 2 months ago, she has remained off weekly methotrexate.  She took  10 mg of methotrexate weekly until April 13 of 2017, whe EXAM:   Physical Exam   Constitutional: She is oriented to person, place, and time. She appears well-developed. HENT:   Head: Normocephalic. Eyes: Conjunctivae are normal.   Cardiovascular: Normal rate and regular rhythm. Occasional irregular beat.

## 2017-12-20 ENCOUNTER — TELEPHONE (OUTPATIENT)
Dept: RHEUMATOLOGY | Facility: CLINIC | Age: 82
End: 2017-12-20

## 2017-12-20 RX ORDER — PREDNISONE 1 MG/1
TABLET ORAL
Qty: 100 TABLET | Refills: 1 | Status: SHIPPED | OUTPATIENT
Start: 2017-12-20 | End: 2018-04-16

## 2017-12-20 NOTE — TELEPHONE ENCOUNTER
Spoke with daughter and aware script for prednisone sent to pharmacy. Directions given to daughter and she verbalized understanding. She will call office back with any issues.

## 2017-12-20 NOTE — TELEPHONE ENCOUNTER
Please see message below and advise. Pt has follow up appt scheduled for 1/8/18. LOV 10/2/17:    ASSESSMENT/PLAN:      1. History of PMR/seronegative rheumatoid arthritis.  After having stopped her methotrexate in mid April of 2017, her PMR/RA flared mon

## 2017-12-20 NOTE — TELEPHONE ENCOUNTER
Per daughter of pt,  Pt is in a lot of stiff and difficulty in getting up in the morning and would like to know if Dr CARRERA can give her Prednisone since it helps her in the past. pls send it to her pharmacy on file;  Otherwise pt would like to talk to Dr Barrington Brennan

## 2018-01-06 NOTE — PROGRESS NOTES
Jonh Sams is a pleasant 80year-old patient of Dr. 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 Since I saw her 3 months ago, she has been back on methotrexate 10 mg weekly. She denies fatigue or stomach upset after each dose.   She w before breakfast.   Disp:  Rfl:      Allergies:No Known Allergies   PHYSICAL EXAM:   Physical Exam   Constitutional: She is oriented to person, place, and time. She appears well-developed. HENT:   Head: Normocephalic.    Eyes: Conjunctivae are normal.   C

## 2018-01-08 ENCOUNTER — LAB ENCOUNTER (OUTPATIENT)
Dept: LAB | Facility: HOSPITAL | Age: 83
End: 2018-01-08
Attending: INTERNAL MEDICINE
Payer: MEDICARE

## 2018-01-08 ENCOUNTER — OFFICE VISIT (OUTPATIENT)
Dept: RHEUMATOLOGY | Facility: CLINIC | Age: 83
End: 2018-01-08

## 2018-01-08 VITALS
SYSTOLIC BLOOD PRESSURE: 138 MMHG | BODY MASS INDEX: 29.77 KG/M2 | DIASTOLIC BLOOD PRESSURE: 81 MMHG | WEIGHT: 168 LBS | HEIGHT: 63 IN | HEART RATE: 98 BPM

## 2018-01-08 DIAGNOSIS — Z51.81 THERAPEUTIC DRUG MONITORING: ICD-10-CM

## 2018-01-08 DIAGNOSIS — M06.09 SERONEGATIVE RHEUMATOID ARTHRITIS OF MULTIPLE SITES (HCC): Primary | ICD-10-CM

## 2018-01-08 DIAGNOSIS — M35.3 POLYMYALGIA RHEUMATICA (HCC): ICD-10-CM

## 2018-01-08 DIAGNOSIS — Z51.81 ENCOUNTER FOR THERAPEUTIC DRUG MONITORING: ICD-10-CM

## 2018-01-08 DIAGNOSIS — I26.99 OTHER ACUTE PULMONARY EMBOLISM WITHOUT ACUTE COR PULMONALE (HCC): ICD-10-CM

## 2018-01-08 DIAGNOSIS — M06.09 SERONEGATIVE RHEUMATOID ARTHRITIS OF MULTIPLE SITES (HCC): ICD-10-CM

## 2018-01-08 DIAGNOSIS — M48.062 SPINAL STENOSIS OF LUMBAR REGION WITH NEUROGENIC CLAUDICATION: ICD-10-CM

## 2018-01-08 LAB
ALBUMIN SERPL BCP-MCNC: 3.9 G/DL (ref 3.5–4.8)
AST SERPL-CCNC: 23 U/L (ref 15–41)
BASOPHILS # BLD: 0 K/UL (ref 0–0.2)
BASOPHILS NFR BLD: 0 %
CREAT SERPL-MCNC: 0.96 MG/DL (ref 0.5–1.5)
CRP SERPL-MCNC: 0.8 MG/DL (ref 0–0.9)
EOSINOPHIL # BLD: 0.2 K/UL (ref 0–0.7)
EOSINOPHIL NFR BLD: 2 %
ERYTHROCYTE [DISTWIDTH] IN BLOOD BY AUTOMATED COUNT: 16 % (ref 11–15)
ERYTHROCYTE [SEDIMENTATION RATE] IN BLOOD: 23 MM/HR (ref 0–42)
HCT VFR BLD AUTO: 38.2 % (ref 35–48)
HGB BLD-MCNC: 12.3 G/DL (ref 12–16)
INR BLD: 2.6 (ref 0.9–1.2)
LYMPHOCYTES # BLD: 1.2 K/UL (ref 1–4)
LYMPHOCYTES NFR BLD: 9 %
MCH RBC QN AUTO: 29.4 PG (ref 27–32)
MCHC RBC AUTO-ENTMCNC: 32.2 G/DL (ref 32–37)
MCV RBC AUTO: 91.3 FL (ref 80–100)
MONOCYTES # BLD: 0.8 K/UL (ref 0–1)
MONOCYTES NFR BLD: 6 %
NEUTROPHILS # BLD AUTO: 10.9 K/UL (ref 1.8–7.7)
NEUTROPHILS NFR BLD: 83 %
PLATELET # BLD AUTO: 346 K/UL (ref 140–400)
PMV BLD AUTO: 8.4 FL (ref 7.4–10.3)
PROTHROMBIN TIME: 27.2 SECONDS (ref 11.8–14.5)
RBC # BLD AUTO: 4.18 M/UL (ref 3.7–5.4)
WBC # BLD AUTO: 13.2 K/UL (ref 4–11)

## 2018-01-08 PROCEDURE — 82565 ASSAY OF CREATININE: CPT

## 2018-01-08 PROCEDURE — 85652 RBC SED RATE AUTOMATED: CPT

## 2018-01-08 PROCEDURE — 36415 COLL VENOUS BLD VENIPUNCTURE: CPT

## 2018-01-08 PROCEDURE — 86140 C-REACTIVE PROTEIN: CPT

## 2018-01-08 PROCEDURE — 82040 ASSAY OF SERUM ALBUMIN: CPT

## 2018-01-08 PROCEDURE — 85025 COMPLETE CBC W/AUTO DIFF WBC: CPT

## 2018-01-08 PROCEDURE — G0463 HOSPITAL OUTPT CLINIC VISIT: HCPCS | Performed by: INTERNAL MEDICINE

## 2018-01-08 PROCEDURE — 84450 TRANSFERASE (AST) (SGOT): CPT

## 2018-01-08 PROCEDURE — 85610 PROTHROMBIN TIME: CPT

## 2018-01-08 PROCEDURE — 99214 OFFICE O/P EST MOD 30 MIN: CPT | Performed by: INTERNAL MEDICINE

## 2018-01-11 ENCOUNTER — TELEPHONE (OUTPATIENT)
Dept: RHEUMATOLOGY | Facility: CLINIC | Age: 83
End: 2018-01-11

## 2018-01-11 NOTE — TELEPHONE ENCOUNTER
Please let her know that her labs are fine. Her inflammation markers are normal.    Please instruct her to continue taking 5mg of prednisone every morning, and the same dose of methotrexate. I'll see her back April 16th. Thanks.

## 2018-04-12 NOTE — PROGRESS NOTES
Ariana Bennett is a pleasant 80year-old patient of Dr. Helon Canavan, with PMR/ CCP and RF negative rheumatoid arthritis. Since I saw her 3 months ago, she has continued methotrexate 10 mg weekly.   Since we spoke on the phone, she has been on prednisone 5 mg e place, and time. She appears well-developed. HENT:   Head: Normocephalic. Eyes: Conjunctivae are normal.   Cardiovascular: Normal rate and regular rhythm. Occasional irregular beat.    Pulmonary/Chest: Effort normal and breath sounds normal.   Abdomi

## 2018-04-16 ENCOUNTER — OFFICE VISIT (OUTPATIENT)
Dept: RHEUMATOLOGY | Facility: CLINIC | Age: 83
End: 2018-04-16

## 2018-04-16 ENCOUNTER — LAB ENCOUNTER (OUTPATIENT)
Dept: LAB | Facility: HOSPITAL | Age: 83
End: 2018-04-16
Attending: INTERNAL MEDICINE
Payer: MEDICARE

## 2018-04-16 VITALS
TEMPERATURE: 98 F | SYSTOLIC BLOOD PRESSURE: 137 MMHG | DIASTOLIC BLOOD PRESSURE: 84 MMHG | HEART RATE: 94 BPM | WEIGHT: 168.25 LBS | BODY MASS INDEX: 31.77 KG/M2 | HEIGHT: 61 IN

## 2018-04-16 DIAGNOSIS — Z51.81 ENCOUNTER FOR THERAPEUTIC DRUG MONITORING: ICD-10-CM

## 2018-04-16 DIAGNOSIS — M06.09 SERONEGATIVE RHEUMATOID ARTHRITIS OF MULTIPLE SITES (HCC): ICD-10-CM

## 2018-04-16 DIAGNOSIS — Z51.81 THERAPEUTIC DRUG MONITORING: ICD-10-CM

## 2018-04-16 DIAGNOSIS — M35.3 POLYMYALGIA RHEUMATICA (HCC): Primary | ICD-10-CM

## 2018-04-16 DIAGNOSIS — M35.3 POLYMYALGIA RHEUMATICA (HCC): ICD-10-CM

## 2018-04-16 PROCEDURE — 36415 COLL VENOUS BLD VENIPUNCTURE: CPT

## 2018-04-16 PROCEDURE — 84450 TRANSFERASE (AST) (SGOT): CPT

## 2018-04-16 PROCEDURE — 85652 RBC SED RATE AUTOMATED: CPT

## 2018-04-16 PROCEDURE — G0463 HOSPITAL OUTPT CLINIC VISIT: HCPCS | Performed by: INTERNAL MEDICINE

## 2018-04-16 PROCEDURE — 86140 C-REACTIVE PROTEIN: CPT

## 2018-04-16 PROCEDURE — 99214 OFFICE O/P EST MOD 30 MIN: CPT | Performed by: INTERNAL MEDICINE

## 2018-04-16 PROCEDURE — 82565 ASSAY OF CREATININE: CPT

## 2018-04-16 PROCEDURE — 85025 COMPLETE CBC W/AUTO DIFF WBC: CPT

## 2018-04-16 PROCEDURE — 82040 ASSAY OF SERUM ALBUMIN: CPT

## 2018-04-16 RX ORDER — METHOTREXATE 2.5 MG/1
TABLET ORAL
Qty: 52 TABLET | Refills: 1 | Status: SHIPPED | OUTPATIENT
Start: 2018-04-16 | End: 2018-10-18

## 2018-04-16 RX ORDER — PREDNISONE 1 MG/1
5 TABLET ORAL
COMMUNITY
End: 2018-04-16

## 2018-04-16 RX ORDER — PREDNISONE 1 MG/1
TABLET ORAL
Qty: 270 TABLET | Refills: 1 | Status: SHIPPED | OUTPATIENT
Start: 2018-04-16 | End: 2020-02-21 | Stop reason: ALTCHOICE

## 2018-07-14 NOTE — PROGRESS NOTES
Michelle Thompson is a pleasant 80year-old patient of Dr. 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 Since I saw her 3 months ago, she has continued methotrexate 10 mg weekly. She had been on 5 mg of prednisone a day when I last saw her. Take 50 mcg by mouth before breakfast.   Disp:  Rfl:      Allergies:No Known Allergies   PHYSICAL EXAM:   Physical Exam   Constitutional: She is oriented to person, place, and time. She appears well-developed. HENT:   Head: Normocephalic.    Eyes: Conjunc

## 2018-07-16 ENCOUNTER — OFFICE VISIT (OUTPATIENT)
Dept: RHEUMATOLOGY | Facility: CLINIC | Age: 83
End: 2018-07-16

## 2018-07-16 ENCOUNTER — LAB ENCOUNTER (OUTPATIENT)
Dept: LAB | Facility: HOSPITAL | Age: 83
End: 2018-07-16
Attending: INTERNAL MEDICINE
Payer: MEDICARE

## 2018-07-16 VITALS
WEIGHT: 169 LBS | BODY MASS INDEX: 31.91 KG/M2 | SYSTOLIC BLOOD PRESSURE: 120 MMHG | HEIGHT: 61 IN | DIASTOLIC BLOOD PRESSURE: 79 MMHG | HEART RATE: 89 BPM

## 2018-07-16 DIAGNOSIS — Z51.81 ENCOUNTER FOR THERAPEUTIC DRUG MONITORING: ICD-10-CM

## 2018-07-16 DIAGNOSIS — M06.09 SERONEGATIVE RHEUMATOID ARTHRITIS OF MULTIPLE SITES (HCC): ICD-10-CM

## 2018-07-16 DIAGNOSIS — M35.3 POLYMYALGIA RHEUMATICA (HCC): Primary | ICD-10-CM

## 2018-07-16 DIAGNOSIS — M48.062 SPINAL STENOSIS OF LUMBAR REGION WITH NEUROGENIC CLAUDICATION: ICD-10-CM

## 2018-07-16 LAB
ALBUMIN SERPL BCP-MCNC: 3.8 G/DL (ref 3.5–4.8)
AST SERPL-CCNC: 25 U/L (ref 15–41)
BASOPHILS # BLD: 0 K/UL (ref 0–0.2)
BASOPHILS NFR BLD: 0 %
CREAT SERPL-MCNC: 1 MG/DL (ref 0.5–1.5)
CRP SERPL-MCNC: <0.5 MG/DL (ref 0–0.9)
EOSINOPHIL # BLD: 0.3 K/UL (ref 0–0.7)
EOSINOPHIL NFR BLD: 3 %
ERYTHROCYTE [DISTWIDTH] IN BLOOD BY AUTOMATED COUNT: 16.3 % (ref 11–15)
ERYTHROCYTE [SEDIMENTATION RATE] IN BLOOD: 26 MM/HR (ref 0–42)
HCT VFR BLD AUTO: 35.8 % (ref 35–48)
HGB BLD-MCNC: 12.1 G/DL (ref 12–16)
LYMPHOCYTES # BLD: 1.2 K/UL (ref 1–4)
LYMPHOCYTES NFR BLD: 15 %
MCH RBC QN AUTO: 31.7 PG (ref 27–32)
MCHC RBC AUTO-ENTMCNC: 33.7 G/DL (ref 32–37)
MCV RBC AUTO: 94.2 FL (ref 80–100)
MONOCYTES # BLD: 0.9 K/UL (ref 0–1)
MONOCYTES NFR BLD: 11 %
NEUTROPHILS # BLD AUTO: 5.9 K/UL (ref 1.8–7.7)
NEUTROPHILS NFR BLD: 71 %
PLATELET # BLD AUTO: 339 K/UL (ref 140–400)
PMV BLD AUTO: 8.9 FL (ref 7.4–10.3)
RBC # BLD AUTO: 3.81 M/UL (ref 3.7–5.4)
WBC # BLD AUTO: 8.2 K/UL (ref 4–11)

## 2018-07-16 PROCEDURE — G0463 HOSPITAL OUTPT CLINIC VISIT: HCPCS | Performed by: INTERNAL MEDICINE

## 2018-07-16 PROCEDURE — 99214 OFFICE O/P EST MOD 30 MIN: CPT | Performed by: INTERNAL MEDICINE

## 2018-07-16 PROCEDURE — 85652 RBC SED RATE AUTOMATED: CPT

## 2018-07-16 PROCEDURE — 82565 ASSAY OF CREATININE: CPT

## 2018-07-16 PROCEDURE — 85025 COMPLETE CBC W/AUTO DIFF WBC: CPT

## 2018-07-16 PROCEDURE — 84450 TRANSFERASE (AST) (SGOT): CPT

## 2018-07-16 PROCEDURE — 82040 ASSAY OF SERUM ALBUMIN: CPT

## 2018-07-16 PROCEDURE — 86140 C-REACTIVE PROTEIN: CPT

## 2018-07-16 PROCEDURE — 36415 COLL VENOUS BLD VENIPUNCTURE: CPT

## 2018-10-18 RX ORDER — FOLIC ACID 1 MG/1
TABLET ORAL
Qty: 90 TABLET | Refills: 3 | Status: SHIPPED | OUTPATIENT
Start: 2018-10-18 | End: 2021-11-02

## 2018-10-18 NOTE — TELEPHONE ENCOUNTER
LOV: 7/16/18  Last Refilled:MTX#52, 1rf 2/31/83  Folic IJJL#47, 3rfs 77/0/53  Labs:AST 25  7/16/18    Future Appointments   Date Time Provider Jason Gaspar   10/22/2018 10:00 AM Marcy Love MD 2014 [facility]       Please advise.

## 2018-10-18 NOTE — PROGRESS NOTES
Miguel bear is a pleasant 80year-old patient of Dr. 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 Since I saw her 3 months ago, she has continued methotrexate 10 mg weekly. She has remained off prednisone. She is doing okay.   She do painful bilaterally. Her hips move okay. There is no obvious synovitis in her wrists or hands, but her MCP joints are 'full'.  strength is fair to good. Lymphadenopathy:     She has no cervical adenopathy.    Neurological: She is alert and oriented

## 2018-10-22 ENCOUNTER — OFFICE VISIT (OUTPATIENT)
Dept: RHEUMATOLOGY | Facility: CLINIC | Age: 83
End: 2018-10-22
Payer: MEDICARE

## 2018-10-22 ENCOUNTER — LAB ENCOUNTER (OUTPATIENT)
Dept: LAB | Facility: HOSPITAL | Age: 83
End: 2018-10-22
Attending: INTERNAL MEDICINE
Payer: MEDICARE

## 2018-10-22 VITALS
WEIGHT: 168 LBS | HEIGHT: 61 IN | HEART RATE: 94 BPM | SYSTOLIC BLOOD PRESSURE: 128 MMHG | BODY MASS INDEX: 31.72 KG/M2 | DIASTOLIC BLOOD PRESSURE: 80 MMHG

## 2018-10-22 DIAGNOSIS — M06.09 SERONEGATIVE RHEUMATOID ARTHRITIS OF MULTIPLE SITES (HCC): ICD-10-CM

## 2018-10-22 DIAGNOSIS — M35.3 POLYMYALGIA RHEUMATICA (HCC): Primary | ICD-10-CM

## 2018-10-22 DIAGNOSIS — M48.062 SPINAL STENOSIS OF LUMBAR REGION WITH NEUROGENIC CLAUDICATION: ICD-10-CM

## 2018-10-22 DIAGNOSIS — Z51.81 ENCOUNTER FOR THERAPEUTIC DRUG MONITORING: ICD-10-CM

## 2018-10-22 PROCEDURE — 85652 RBC SED RATE AUTOMATED: CPT

## 2018-10-22 PROCEDURE — 82565 ASSAY OF CREATININE: CPT

## 2018-10-22 PROCEDURE — 82040 ASSAY OF SERUM ALBUMIN: CPT

## 2018-10-22 PROCEDURE — 86140 C-REACTIVE PROTEIN: CPT

## 2018-10-22 PROCEDURE — 99214 OFFICE O/P EST MOD 30 MIN: CPT | Performed by: INTERNAL MEDICINE

## 2018-10-22 PROCEDURE — 36415 COLL VENOUS BLD VENIPUNCTURE: CPT

## 2018-10-22 PROCEDURE — G0463 HOSPITAL OUTPT CLINIC VISIT: HCPCS | Performed by: INTERNAL MEDICINE

## 2018-10-22 PROCEDURE — 84450 TRANSFERASE (AST) (SGOT): CPT

## 2018-10-22 PROCEDURE — 85025 COMPLETE CBC W/AUTO DIFF WBC: CPT

## 2019-02-09 NOTE — PROGRESS NOTES
Ariana Mike is a pleasant 80year-old patient of Dr. 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 Since I saw her 4 months ago, she has continued methotrexate 10 mg weekly. She has remained off prednisone. She is doing okay.   She do Shoulder motion is limited in painful bilaterally. Her hips move okay. There is no obvious synovitis in her wrists or hands, but her MCP joints are 'full'.  strength is fair to good. Lymphadenopathy:     She has no cervical adenopathy.    Neurolo

## 2019-02-11 ENCOUNTER — LAB ENCOUNTER (OUTPATIENT)
Dept: LAB | Facility: HOSPITAL | Age: 84
End: 2019-02-11
Attending: INTERNAL MEDICINE
Payer: MEDICARE

## 2019-02-11 ENCOUNTER — OFFICE VISIT (OUTPATIENT)
Dept: RHEUMATOLOGY | Facility: CLINIC | Age: 84
End: 2019-02-11
Payer: MEDICARE

## 2019-02-11 VITALS
BODY MASS INDEX: 30.58 KG/M2 | HEART RATE: 91 BPM | DIASTOLIC BLOOD PRESSURE: 83 MMHG | SYSTOLIC BLOOD PRESSURE: 120 MMHG | WEIGHT: 162 LBS | HEIGHT: 61 IN

## 2019-02-11 DIAGNOSIS — M06.09 SERONEGATIVE RHEUMATOID ARTHRITIS OF MULTIPLE SITES (HCC): ICD-10-CM

## 2019-02-11 DIAGNOSIS — M54.16 LUMBAR RADICULOPATHY: ICD-10-CM

## 2019-02-11 DIAGNOSIS — M35.3 POLYMYALGIA RHEUMATICA (HCC): Primary | ICD-10-CM

## 2019-02-11 DIAGNOSIS — Z51.81 ENCOUNTER FOR THERAPEUTIC DRUG MONITORING: ICD-10-CM

## 2019-02-11 LAB
ALBUMIN SERPL BCP-MCNC: 3.8 G/DL (ref 3.5–4.8)
AST SERPL-CCNC: 23 U/L (ref 15–41)
BASOPHILS # BLD AUTO: 0.05 X10(3) UL (ref 0–0.2)
BASOPHILS NFR BLD AUTO: 0.7 %
CREAT SERPL-MCNC: 0.97 MG/DL (ref 0.5–1.5)
CRP SERPL-MCNC: <0.5 MG/DL (ref 0–0.9)
DEPRECATED RDW RBC AUTO: 54.4 FL (ref 35.1–46.3)
EOSINOPHIL # BLD AUTO: 0.19 X10(3) UL (ref 0–0.7)
EOSINOPHIL NFR BLD AUTO: 2.7 %
ERYTHROCYTE [DISTWIDTH] IN BLOOD BY AUTOMATED COUNT: 15.6 % (ref 11–15)
ERYTHROCYTE [SEDIMENTATION RATE] IN BLOOD: 16 MM/HR (ref 0–42)
HCT VFR BLD AUTO: 39.3 % (ref 35–48)
HGB BLD-MCNC: 12.5 G/DL (ref 12–16)
IMM GRANULOCYTES # BLD AUTO: 0.03 X10(3) UL (ref 0–1)
IMM GRANULOCYTES NFR BLD: 0.4 %
LYMPHOCYTES # BLD AUTO: 1.1 X10(3) UL (ref 1–4)
LYMPHOCYTES NFR BLD AUTO: 15.8 %
MCH RBC QN AUTO: 30.6 PG (ref 26–34)
MCHC RBC AUTO-ENTMCNC: 31.8 G/DL (ref 31–37)
MCV RBC AUTO: 96.3 FL (ref 80–100)
MONOCYTES # BLD AUTO: 0.7 X10(3) UL (ref 0.1–1)
MONOCYTES NFR BLD AUTO: 10 %
NEUTROPHILS # BLD AUTO: 4.9 X10 (3) UL (ref 1.5–7.7)
NEUTROPHILS # BLD AUTO: 4.9 X10(3) UL (ref 1.5–7.7)
NEUTROPHILS NFR BLD AUTO: 70.4 %
PLATELET # BLD AUTO: 357 10(3)UL (ref 150–450)
RBC # BLD AUTO: 4.08 X10(6)UL (ref 3.8–5.3)
WBC # BLD AUTO: 7 X10(3) UL (ref 4–11)

## 2019-02-11 PROCEDURE — 36415 COLL VENOUS BLD VENIPUNCTURE: CPT

## 2019-02-11 PROCEDURE — 85025 COMPLETE CBC W/AUTO DIFF WBC: CPT

## 2019-02-11 PROCEDURE — 82040 ASSAY OF SERUM ALBUMIN: CPT

## 2019-02-11 PROCEDURE — 82565 ASSAY OF CREATININE: CPT

## 2019-02-11 PROCEDURE — 84450 TRANSFERASE (AST) (SGOT): CPT

## 2019-02-11 PROCEDURE — 85652 RBC SED RATE AUTOMATED: CPT

## 2019-02-11 PROCEDURE — G0463 HOSPITAL OUTPT CLINIC VISIT: HCPCS | Performed by: INTERNAL MEDICINE

## 2019-02-11 PROCEDURE — 99214 OFFICE O/P EST MOD 30 MIN: CPT | Performed by: INTERNAL MEDICINE

## 2019-02-11 PROCEDURE — 86140 C-REACTIVE PROTEIN: CPT

## 2019-04-17 NOTE — TELEPHONE ENCOUNTER
LOV: 2/11/19  Future Appointments   Date Time Provider Jason Chasity   8/12/2019  8:40 AM Delbert Tapia MD 2014 [facility]     Labs:   Component      Latest Ref Rng & Units 2/11/2019   WBC      4.0 - 11.0 x10(3) uL 7.0   RBC      3.80 - 5.30 x1

## 2019-04-17 NOTE — TELEPHONE ENCOUNTER
Patient has change in part D plan so needs new RX for     methotrexate 2.5 MG Oral Tab TAKE 4 TABLETS BY MOUTH EVERY MONDAY WITH DINNER Disp: 52 tablet Rfl: 1     Sent to [facility] and updated on profile

## 2019-08-12 ENCOUNTER — OFFICE VISIT (OUTPATIENT)
Dept: RHEUMATOLOGY | Facility: CLINIC | Age: 84
End: 2019-08-12
Payer: MEDICARE

## 2019-08-12 ENCOUNTER — LAB ENCOUNTER (OUTPATIENT)
Dept: LAB | Facility: HOSPITAL | Age: 84
End: 2019-08-12
Attending: INTERNAL MEDICINE
Payer: MEDICARE

## 2019-08-12 VITALS
DIASTOLIC BLOOD PRESSURE: 78 MMHG | HEIGHT: 61 IN | WEIGHT: 158.81 LBS | SYSTOLIC BLOOD PRESSURE: 129 MMHG | BODY MASS INDEX: 29.98 KG/M2 | HEART RATE: 99 BPM

## 2019-08-12 DIAGNOSIS — Z51.81 ENCOUNTER FOR THERAPEUTIC DRUG MONITORING: ICD-10-CM

## 2019-08-12 DIAGNOSIS — M06.09 SERONEGATIVE RHEUMATOID ARTHRITIS OF MULTIPLE SITES (HCC): ICD-10-CM

## 2019-08-12 DIAGNOSIS — M54.16 LUMBAR RADICULOPATHY: ICD-10-CM

## 2019-08-12 DIAGNOSIS — M35.3 POLYMYALGIA RHEUMATICA (HCC): Primary | ICD-10-CM

## 2019-08-12 LAB
ALBUMIN SERPL-MCNC: 3.8 G/DL (ref 3.4–5)
AST SERPL-CCNC: 17 U/L (ref 15–37)
BASOPHILS # BLD AUTO: 0.04 X10(3) UL (ref 0–0.2)
BASOPHILS NFR BLD AUTO: 0.6 %
CREAT BLD-MCNC: 1.03 MG/DL (ref 0.55–1.02)
CRP SERPL-MCNC: <0.29 MG/DL (ref ?–0.3)
DEPRECATED RDW RBC AUTO: 52.5 FL (ref 35.1–46.3)
EOSINOPHIL # BLD AUTO: 0.31 X10(3) UL (ref 0–0.7)
EOSINOPHIL NFR BLD AUTO: 4.3 %
ERYTHROCYTE [DISTWIDTH] IN BLOOD BY AUTOMATED COUNT: 15 % (ref 11–15)
ERYTHROCYTE [SEDIMENTATION RATE] IN BLOOD: 16 MM/HR (ref 0–42)
HCT VFR BLD AUTO: 40.1 % (ref 35–48)
HGB BLD-MCNC: 12.9 G/DL (ref 12–16)
IMM GRANULOCYTES # BLD AUTO: 0.05 X10(3) UL (ref 0–1)
IMM GRANULOCYTES NFR BLD: 0.7 %
LYMPHOCYTES # BLD AUTO: 1.15 X10(3) UL (ref 1–4)
LYMPHOCYTES NFR BLD AUTO: 16 %
MCH RBC QN AUTO: 31.2 PG (ref 26–34)
MCHC RBC AUTO-ENTMCNC: 32.2 G/DL (ref 31–37)
MCV RBC AUTO: 96.9 FL (ref 80–100)
MONOCYTES # BLD AUTO: 0.79 X10(3) UL (ref 0.1–1)
MONOCYTES NFR BLD AUTO: 11 %
NEUTROPHILS # BLD AUTO: 4.85 X10 (3) UL (ref 1.5–7.7)
NEUTROPHILS # BLD AUTO: 4.85 X10(3) UL (ref 1.5–7.7)
NEUTROPHILS NFR BLD AUTO: 67.4 %
PLATELET # BLD AUTO: 338 10(3)UL (ref 150–450)
RBC # BLD AUTO: 4.14 X10(6)UL (ref 3.8–5.3)
WBC # BLD AUTO: 7.2 X10(3) UL (ref 4–11)

## 2019-08-12 PROCEDURE — 86140 C-REACTIVE PROTEIN: CPT

## 2019-08-12 PROCEDURE — 99214 OFFICE O/P EST MOD 30 MIN: CPT | Performed by: INTERNAL MEDICINE

## 2019-08-12 PROCEDURE — 82040 ASSAY OF SERUM ALBUMIN: CPT

## 2019-08-12 PROCEDURE — 85652 RBC SED RATE AUTOMATED: CPT

## 2019-08-12 PROCEDURE — 36415 COLL VENOUS BLD VENIPUNCTURE: CPT

## 2019-08-12 PROCEDURE — 82565 ASSAY OF CREATININE: CPT

## 2019-08-12 PROCEDURE — G0463 HOSPITAL OUTPT CLINIC VISIT: HCPCS | Performed by: INTERNAL MEDICINE

## 2019-08-12 PROCEDURE — 84450 TRANSFERASE (AST) (SGOT): CPT

## 2019-08-12 PROCEDURE — 85025 COMPLETE CBC W/AUTO DIFF WBC: CPT

## 2020-02-20 NOTE — PROGRESS NOTES
Michelle Thompson is a delightful 80year-old patient of Dr. 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 Since I saw her 6 months ago, she has continued methotrexate 10 mg weekly. She has remained off prednisone. She is doing okay.   She exhibits no edema. Shoulder motion is limited in painful bilaterally. Her hips move okay. There is no obvious synovitis in her wrists or hands, but her MCP joints are 'full'.  strength is fair to good.    Lymphadenopathy:     She has no cervical ad

## 2020-02-21 ENCOUNTER — LAB ENCOUNTER (OUTPATIENT)
Dept: LAB | Facility: HOSPITAL | Age: 85
End: 2020-02-21
Attending: INTERNAL MEDICINE
Payer: MEDICARE

## 2020-02-21 ENCOUNTER — OFFICE VISIT (OUTPATIENT)
Dept: RHEUMATOLOGY | Facility: CLINIC | Age: 85
End: 2020-02-21
Payer: MEDICARE

## 2020-02-21 VITALS
HEIGHT: 61 IN | BODY MASS INDEX: 29.45 KG/M2 | DIASTOLIC BLOOD PRESSURE: 81 MMHG | SYSTOLIC BLOOD PRESSURE: 144 MMHG | HEART RATE: 98 BPM | WEIGHT: 156 LBS

## 2020-02-21 DIAGNOSIS — M06.09 SERONEGATIVE RHEUMATOID ARTHRITIS OF MULTIPLE SITES (HCC): ICD-10-CM

## 2020-02-21 DIAGNOSIS — Z51.81 ENCOUNTER FOR THERAPEUTIC DRUG MONITORING: ICD-10-CM

## 2020-02-21 DIAGNOSIS — M48.062 SPINAL STENOSIS OF LUMBAR REGION WITH NEUROGENIC CLAUDICATION: ICD-10-CM

## 2020-02-21 DIAGNOSIS — M35.3 POLYMYALGIA RHEUMATICA (HCC): Primary | ICD-10-CM

## 2020-02-21 LAB
ALBUMIN SERPL-MCNC: 3.6 G/DL (ref 3.4–5)
AST SERPL-CCNC: 18 U/L (ref 15–37)
BASOPHILS # BLD AUTO: 0.03 X10(3) UL (ref 0–0.2)
BASOPHILS NFR BLD AUTO: 0.5 %
CREAT BLD-MCNC: 0.88 MG/DL (ref 0.55–1.02)
CRP SERPL-MCNC: <0.29 MG/DL (ref ?–0.3)
DEPRECATED RDW RBC AUTO: 52.1 FL (ref 35.1–46.3)
EOSINOPHIL # BLD AUTO: 0.27 X10(3) UL (ref 0–0.7)
EOSINOPHIL NFR BLD AUTO: 4.1 %
ERYTHROCYTE [DISTWIDTH] IN BLOOD BY AUTOMATED COUNT: 15.2 % (ref 11–15)
ERYTHROCYTE [SEDIMENTATION RATE] IN BLOOD: 18 MM/HR (ref 0–42)
HCT VFR BLD AUTO: 37.4 % (ref 35–48)
HGB BLD-MCNC: 12 G/DL (ref 12–16)
IMM GRANULOCYTES # BLD AUTO: 0.02 X10(3) UL (ref 0–1)
IMM GRANULOCYTES NFR BLD: 0.3 %
LYMPHOCYTES # BLD AUTO: 1.06 X10(3) UL (ref 1–4)
LYMPHOCYTES NFR BLD AUTO: 15.9 %
MCH RBC QN AUTO: 30.7 PG (ref 26–34)
MCHC RBC AUTO-ENTMCNC: 32.1 G/DL (ref 31–37)
MCV RBC AUTO: 95.7 FL (ref 80–100)
MONOCYTES # BLD AUTO: 0.63 X10(3) UL (ref 0.1–1)
MONOCYTES NFR BLD AUTO: 9.5 %
NEUTROPHILS # BLD AUTO: 4.64 X10 (3) UL (ref 1.5–7.7)
NEUTROPHILS # BLD AUTO: 4.64 X10(3) UL (ref 1.5–7.7)
NEUTROPHILS NFR BLD AUTO: 69.7 %
PLATELET # BLD AUTO: 355 10(3)UL (ref 150–450)
RBC # BLD AUTO: 3.91 X10(6)UL (ref 3.8–5.3)
WBC # BLD AUTO: 6.7 X10(3) UL (ref 4–11)

## 2020-02-21 PROCEDURE — G0463 HOSPITAL OUTPT CLINIC VISIT: HCPCS | Performed by: INTERNAL MEDICINE

## 2020-02-21 PROCEDURE — 84450 TRANSFERASE (AST) (SGOT): CPT

## 2020-02-21 PROCEDURE — 82040 ASSAY OF SERUM ALBUMIN: CPT

## 2020-02-21 PROCEDURE — 85652 RBC SED RATE AUTOMATED: CPT

## 2020-02-21 PROCEDURE — 85025 COMPLETE CBC W/AUTO DIFF WBC: CPT

## 2020-02-21 PROCEDURE — 82565 ASSAY OF CREATININE: CPT

## 2020-02-21 PROCEDURE — 99214 OFFICE O/P EST MOD 30 MIN: CPT | Performed by: INTERNAL MEDICINE

## 2020-02-21 PROCEDURE — 86140 C-REACTIVE PROTEIN: CPT

## 2020-02-21 PROCEDURE — 36415 COLL VENOUS BLD VENIPUNCTURE: CPT

## 2020-10-06 NOTE — PROGRESS NOTES
Josemanuel Sanchez is a delightful 80year-old patient of Dr. 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 Since I saw her 6 months ago, she has continued methotrexate 10 mg weekly. She has remained off prednisone. She is doing okay.   She exhibits no edema. Shoulder motion is limited in painful bilaterally. Her hips move okay. There is no obvious synovitis in her wrists or hands, but her MCP joints are 'full'.  strength is fair to good.    Lymphadenopathy:     She has no cervical ad

## 2020-10-07 ENCOUNTER — VIRTUAL PHONE E/M (OUTPATIENT)
Dept: RHEUMATOLOGY | Facility: CLINIC | Age: 85
End: 2020-10-07
Payer: MEDICARE

## 2020-10-07 DIAGNOSIS — Z51.81 ENCOUNTER FOR THERAPEUTIC DRUG MONITORING: ICD-10-CM

## 2020-10-07 DIAGNOSIS — M06.09 SERONEGATIVE RHEUMATOID ARTHRITIS OF MULTIPLE SITES (HCC): Primary | ICD-10-CM

## 2020-10-07 PROCEDURE — 99441 PHONE E/M BY PHYS 5-10 MIN: CPT | Performed by: INTERNAL MEDICINE

## 2021-01-25 ENCOUNTER — TELEPHONE (OUTPATIENT)
Dept: RHEUMATOLOGY | Facility: CLINIC | Age: 86
End: 2021-01-25

## 2021-01-25 NOTE — TELEPHONE ENCOUNTER
Ashok Carreno, states that the patient went to see her pcp and the directions for methotrexate medication was changed. Ashok Carreno, states that the daily giving was changed to 2.5 milligrams daily.  Ashok Carreno, would like to know if a new order can be faxed to her to give

## 2021-01-26 NOTE — TELEPHONE ENCOUNTER
Spoke with Carmen Dawson at [facility] needs new Prescription for methotrexate and Md order sent to [facility] ,   LOV: 10/2020 virtual   No future   appointments.    LABS: in care everywhere     Also needs Md order faxed to [facility] [phone]

## 2021-08-06 NOTE — TELEPHONE ENCOUNTER
Last filled: 1/26/21 #52 tab with 1 refill  LOV: 10/7/20  No future appointments. Labs: 2/21/20      Please advise.

## 2021-10-25 ENCOUNTER — TELEPHONE (OUTPATIENT)
Dept: RHEUMATOLOGY | Facility: CLINIC | Age: 86
End: 2021-10-25

## 2021-10-25 DIAGNOSIS — Z51.81 ENCOUNTER FOR THERAPEUTIC DRUG MONITORING: Primary | ICD-10-CM

## 2021-10-25 DIAGNOSIS — M06.09 SERONEGATIVE RHEUMATOID ARTHRITIS OF MULTIPLE SITES (HCC): ICD-10-CM

## 2021-10-25 NOTE — TELEPHONE ENCOUNTER
Nayely Jannet informed that we will need labs. They will complete labs on the appointment day.      Future Appointments   Date Time Provider Jason Chasity   11/2/2021  3:10 PM Stefano Brito MD [facility] [place] [facility] [place]

## 2021-10-25 NOTE — TELEPHONE ENCOUNTER
Per daughter of patient, needs refill of medication Methotrexate Sodium.     Current Outpatient Medications   Medication Sig Dispense Refill   • Methotrexate Sodium 2.5 MG Oral Tab TAKE 4 TABLETS EVERY MONDAYWITH DINNER 52 tablet 0

## 2021-10-25 NOTE — TELEPHONE ENCOUNTER
Left message to daughter Joselin Smalls ([facility]) to schedule an appointment with Dr. Warner Merlin for her mother to receive any refills and she will also need to complete monitoring labs. If patient or daughter return call, please help schedule a follow-up.

## 2021-10-28 NOTE — PROGRESS NOTES
Jonh Sams is a delightful 80year-old patient of Dr. 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 Since her last encounter October of 2020, she has continued methotrexate 10 mg weekly, with folic acid 1 mg daily. She is doing okay. obvious synovitis in her wrists or hands. Some ulnar deviation of her hand MCP joints.  strength is fair to good. Lymphadenopathy:     She has no cervical adenopathy. Neurological: She is alert and oriented to person, place, and time.    Skin: No r

## 2021-11-02 ENCOUNTER — OFFICE VISIT (OUTPATIENT)
Dept: RHEUMATOLOGY | Facility: CLINIC | Age: 86
End: 2021-11-02
Payer: MEDICARE

## 2021-11-02 VITALS
HEART RATE: 71 BPM | SYSTOLIC BLOOD PRESSURE: 137 MMHG | HEIGHT: 61 IN | BODY MASS INDEX: 25.3 KG/M2 | DIASTOLIC BLOOD PRESSURE: 74 MMHG | WEIGHT: 134 LBS

## 2021-11-02 DIAGNOSIS — Z51.81 ENCOUNTER FOR THERAPEUTIC DRUG MONITORING: ICD-10-CM

## 2021-11-02 DIAGNOSIS — M06.09 SERONEGATIVE RHEUMATOID ARTHRITIS OF MULTIPLE SITES (HCC): Primary | ICD-10-CM

## 2021-11-02 DIAGNOSIS — M48.062 SPINAL STENOSIS OF LUMBAR REGION WITH NEUROGENIC CLAUDICATION: ICD-10-CM

## 2021-11-02 PROCEDURE — 99214 OFFICE O/P EST MOD 30 MIN: CPT | Performed by: INTERNAL MEDICINE

## 2021-11-02 RX ORDER — GABAPENTIN 100 MG/1
100 CAPSULE ORAL NIGHTLY
COMMUNITY
Start: 2021-09-07

## 2021-11-02 RX ORDER — METHOTREXATE 2.5 MG/1
TABLET ORAL
Qty: 52 TABLET | Refills: 3 | Status: SHIPPED | OUTPATIENT
Start: 2021-11-02 | End: 2022-01-27

## 2021-11-02 RX ORDER — FOLIC ACID 1 MG/1
TABLET ORAL
Qty: 90 TABLET | Refills: 3 | Status: SHIPPED | OUTPATIENT
Start: 2021-11-02

## 2022-01-27 RX ORDER — METHOTREXATE 2.5 MG/1
TABLET ORAL
Qty: 52 TABLET | Refills: 3 | Status: SHIPPED | OUTPATIENT
Start: 2022-01-27

## 2022-01-27 NOTE — TELEPHONE ENCOUNTER
Patients daughter calling in regards to the following medication refill request. Daughter Paresh Lanier states patients facility [address] [facility] RN tried multiple times for a refill she has been unsuccessful.  Paresh Lanier would like to  the medication

## 2022-11-02 ENCOUNTER — TELEPHONE (OUTPATIENT)
Dept: RHEUMATOLOGY | Facility: CLINIC | Age: 87
End: 2022-11-02

## (undated) NOTE — LETTER
Hospital Discharge Documentation  Please phone to schedule a hospital follow up appointment.     From: [id number] Hospitalist's Office  Phone: [phone]    Patient discharged time/date: 4/20/2017  4:10 PM  Patient discharge disposition:  Home or Self Other acute pulmonary embolism without acute cor pulmonale (HCC)     Acute deep vein thrombosis (DVT) of other specified vein of left lower extremity (HCC)     Chest pain        Physical Exam:     Gen: No acute distress, alert and oriented x3  Pulm: Suazo Damián - checked echo- EF 42% Grade 1 Diastolic Dysfunction  - given that she is a fall risk and she lives alone and that her INR trended up we watched here another day until INR drifts down, now improved so will discharge home with home health    HTN    - resume aspirin 81 MG Tabs         methotrexate 2.5 MG Tabs   Commonly known as:  RHEUMATREX                Where to Get Your Medications      These medications were sent to [facility] [address] [place] [address] [phone], [phone] 101

## (undated) NOTE — IP AVS SNAPSHOT
[address] [phone]                Discharge Summary   4/13/2017    Bond Sailors           Admission Information        Provider Department    4/13/2017 Richard Cervantes MD [facility] gabapentin 300 MG Caps   Last time this was given:  600 mg on 4/19/2017  9:34 PM   Commonly known as:  NEURONTIN   Next dose due:  4/20/17 evening          Take 600 mg by mouth nightly.                             LEVOTHROID 50 MCG Tabs   Last time this wa Discharge Orders     Future Labs/Procedures Expected by Expires    PROTHROMBIN TIME (PT)  4/21/2017 (Approximate) 4/21/2018    Questions:      Spec comment:        Immunization History as of 4/20/2017  Reviewed on 4/13/2017    No immunizations on file. Order Current Status    RAINBOW DRAW DARK GREEN Collected (04/13/17 1807)      Radiology Exams     None      Patient Belongings       Most Recent Value    All belongings returned to patient at discharge Pt's bedside belongings    Medications Sent Home Non _____________________________________________________________________________    Medication Side Effects - Medications to be taken at home  As your caregivers, we want you to be aware of the medications you are prescribed to take and their potential SIDE E Use: Regulate metabolism and inflammation   Most common side effects:  Dizziness, swelling, appetite changes, weight changes, changes in sleep and alertness, palpitations   What to report to your healthcare team:  Changes in thinking, confusion, skin

## (undated) NOTE — MR AVS SNAPSHOT
[address] [address]  [phone]               Thank you for choosing us for your health care visit with Christophe Rivera MD.  We are glad to serve you and happy to provide you with this sum [employer]     Sign up for [facility] [facility], your secure online medical record. [employer] will allow you to access patient instructions from your recent visit,  view other health information, and more.  To sign up or find more information, go to [email or web address] Don’t eat while distracted and slow down. Avoid over sized portions. Don’t eat while when you’re bored.      EAT THESE FOODS MORE OFTEN: EAT THESE FOODS LESS OFTEN:   Make half your plate fruits and vegetables Highly refined, white starches including wh